# Patient Record
Sex: FEMALE | URBAN - METROPOLITAN AREA
[De-identification: names, ages, dates, MRNs, and addresses within clinical notes are randomized per-mention and may not be internally consistent; named-entity substitution may affect disease eponyms.]

---

## 2020-04-22 ENCOUNTER — OFFICE VISIT (OUTPATIENT)
Dept: URBAN - METROPOLITAN AREA CLINIC 88 | Facility: CLINIC | Age: 55
End: 2020-04-22
Payer: COMMERCIAL

## 2020-04-22 DIAGNOSIS — E11.3293 TYPE 2 DIAB W MILD NONPRLF DIABETIC RTNOP W/O MACULAR EDEMA, BILATERAL: Primary | ICD-10-CM

## 2020-04-22 PROCEDURE — 99203 OFFICE O/P NEW LOW 30 MIN: CPT | Performed by: OPHTHALMOLOGY

## 2020-04-22 ASSESSMENT — INTRAOCULAR PRESSURE
OD: 11
OS: 11

## 2020-04-22 NOTE — IMPRESSION/PLAN
Impression: Type 2 diab w mild nonprlf diabetic rtnop w/o macular edema, bilateral: Z16.3312. Plan: Discussed diagnosis in detail with patient. Discussed ocular and systemic benefits of blood sugar control. Keep future appts. with PCP. No treatment necessary at this time. Patient was instructed to monitor vision for sudden changes and to call if visual changes noted.

## 2022-09-20 ENCOUNTER — OFFICE VISIT (OUTPATIENT)
Dept: URBAN - METROPOLITAN AREA CLINIC 89 | Facility: CLINIC | Age: 57
End: 2022-09-20

## 2022-09-20 DIAGNOSIS — H16.142 PUNCTATE KERATITIS OF LEFT EYE: Primary | ICD-10-CM

## 2022-09-20 PROCEDURE — 92002 INTRM OPH EXAM NEW PATIENT: CPT | Performed by: OPTOMETRIST

## 2022-09-20 ASSESSMENT — INTRAOCULAR PRESSURE
OS: 17
OD: 12

## 2022-09-20 NOTE — IMPRESSION/PLAN
Impression: Punctate keratitis of left eye: H16.142. Plan: Suspect resolving abrasion. Artificial tears PRN. Patient advised of the ophthalmic effects of diabetes and the need for a dilated examination.   RTC for dilated DM examination [MADDIE 3y]

## 2022-09-23 ENCOUNTER — OFFICE VISIT (OUTPATIENT)
Dept: URBAN - METROPOLITAN AREA CLINIC 89 | Facility: CLINIC | Age: 57
End: 2022-09-23
Payer: COMMERCIAL

## 2022-09-23 DIAGNOSIS — E11.9 DIABETES MELLITUS TYPE 2 WITHOUT MENTION OF COMPLICATION: Primary | ICD-10-CM

## 2022-09-23 DIAGNOSIS — H11.153 PINGUECULA, BILATERAL: ICD-10-CM

## 2022-09-23 DIAGNOSIS — H43.392 OTHER VITREOUS OPACITIES, LEFT EYE: ICD-10-CM

## 2022-09-23 DIAGNOSIS — H25.13 AGE-RELATED NUCLEAR CATARACT, BILATERAL: ICD-10-CM

## 2022-09-23 PROCEDURE — 92014 COMPRE OPH EXAM EST PT 1/>: CPT | Performed by: OPTOMETRIST

## 2022-09-23 ASSESSMENT — INTRAOCULAR PRESSURE
OS: 15
OD: 19

## 2022-09-23 NOTE — IMPRESSION/PLAN
Impression: Diabetes mellitus Type 2 without mention of complication: R90.1. Plan: Patient educated regarding pathophysiology of DM and that DM is the leading cause of preventable blindness in adults. Educated patient regarding the importance of good blood sugar control and A1c monitoring. RTC 1 year else PRN.

## 2022-09-23 NOTE — IMPRESSION/PLAN
Impression: Other vitreous opacities, left eye: H43.392. Plan: Discussion with patient regarding vitreous floaters and that while it is generally a benign condition, it can be bothersome. Patient advised of signs/symptoms associated with retinal tear/break/detachment and to RTC ASAP.

## 2024-11-05 ENCOUNTER — OFFICE VISIT (OUTPATIENT)
Dept: URBAN - METROPOLITAN AREA CLINIC 89 | Facility: CLINIC | Age: 59
End: 2024-11-05
Payer: COMMERCIAL

## 2024-11-05 DIAGNOSIS — H11.31 SUBCONJUNCTIVAL BLEEDING OF RIGHT EYE: ICD-10-CM

## 2024-11-05 DIAGNOSIS — E11.9 DIABETES MELLITUS TYPE 2 WITHOUT MENTION OF COMPLICATION: Primary | ICD-10-CM

## 2024-11-05 PROCEDURE — 92014 COMPRE OPH EXAM EST PT 1/>: CPT | Performed by: OPTOMETRIST

## 2024-11-05 ASSESSMENT — INTRAOCULAR PRESSURE
OS: 10
OD: 10